# Patient Record
(demographics unavailable — no encounter records)

---

## 2017-10-12 NOTE — ERA
ER Documentation


Chief Complaint


Date/Time


DATE: 10/12/17 


TIME: 23:54


Chief Complaint


PT reports RUQ pain that radiates to back worsens after eating





HPI


41-year-old female patient with no significant past medical history presents to 

the ED complaining of right upper quadrant abdominal pain that radiates to her 

back that started earlier today.  Patient states that it worsens after eating.  

Reports that her last meal was at 12 PM.  Describes the pain as pressure-like 

and rates it a 6 out of 10.  Denies any fever, chills, nausea, vomiting, 

diarrhea, constipation, dysuria, urgency, frequency, chest pain, shortness of 

breath.  States that her last menstruation was on 2017.





ROS


All systems reviewed and are negative except as per history of present illness.





Allergies


Allergies:  


Coded Allergies:  


     No Known Allergy (Unverified , 10/12/17)





PMhx/Soc


Medical and Surgical Hx:  pt denies Medical Hx


History of Surgery:  Yes ()


Anesthesia Reaction:  No


Hx Neurological Disorder:  No


Hx Respiratory Disorders:  No


Hx Cardiac Disorders:  No


Hx Psychiatric Problems:  No


Hx Miscellaneous Medical Probl:  No


Hx Alcohol Use:  No


Hx Substance Use:  No


Hx Tobacco Use:  No


Smoking Status:  Never smoker





Physical Exam


Vitals





Vital Signs








  Date Time  Temp Pulse Resp B/P Pulse Ox O2 Delivery O2 Flow Rate FiO2


 


10/13/17 02:42 97.9 78 16 116/80 98 Room Air  


 


10/13/17 01:11 98.1 76 16 132/82 99 Room Air  


 


10/12/17 19:35 99.2 62 16 134/89 99   








Physical Exam


Const: Non-ill-appearing, well-nourished. In no acute distress.


Head: Atraumatic, normocephalic 


Eyes: Normal Conjunctiva without injection. No purulent discharge. 


ENT: Normal external ear, nose. Moist oropharynx without tonsillar exudates. Non

-erythematous pharynx. Uvula midline. No drooling.  No trismus. 


Neck: No cervical midline tenderness.  Full range of motion. No meningismus. No 

cervical lymphadenopathy. No JVD.


Resp: Clear to auscultation bilaterally. No wheezing, rhonchi, rales, or 

crackles. No accessory muscle use. No retractions.


Cardio: Regular rate and rhythm.  No murmurs, rubs or gallops.


Abd: Soft, right upper quadrant tenderness, non distended. Normal bowel sounds.

  No palpable masses.  No rebound tenderness.  No guarding.  Negative McBurney'

s point. Negative psoas sign. Negative obturator sign.


Skin: No petechiae or rashes


Back: No midline tenderness. No CVA tenderness.


Ext: No cyanosis, or edema.


Neur: Awake and alert. Normal gait. Normal coordination. 


Psych: Normal Mood and Affect


Result Diagram:  


10/12/17 2135                                                                  

              10/12/17 2135





Results 24 hrs





 Laboratory Tests








Test


  10/12/17


21:35 10/12/17


22:00


 


White Blood Count 11.410^3/ul  


 


Red Blood Count 4.6310^6/ul  


 


Hemoglobin 13.8g/dl  


 


Hematocrit 41.4%  


 


Mean Corpuscular Volume 89.4fl  


 


Mean Corpuscular Hemoglobin 29.8pg  


 


Mean Corpuscular Hemoglobin


Concent 33.3g/dl 


  


 


 


Red Cell Distribution Width 14.4%  


 


Platelet Count 47296^3/UL  


 


Mean Platelet Volume 11.8fl  


 


Neutrophils % 57.4%  


 


Lymphocytes % 34.0%  


 


Monocytes % 6.2%  


 


Eosinophils % 1.7%  


 


Basophils % 0.4%  


 


Nucleated Red Blood Cells % 0.0/100WBC  


 


Neutrophils # 6.610^3/ul  


 


Lymphocytes # 3.910^3/ul  


 


Monocytes # 0.710^3/ul  


 


Eosinophils # 0.210^3/ul  


 


Basophils # 0.110^3/ul  


 


Nucleated Red Blood Cells # 0.010^3/ul  


 


Sodium Level 141mmol/L  


 


Potassium Level 3.9mmol/L  


 


Chloride Level 108mmol/L  


 


Carbon Dioxide Level 24mmol/L  


 


Anion Gap 13  


 


Blood Urea Nitrogen 10mg/dl  


 


Creatinine 0.61mg/dl  


 


Glucose Level 84mg/dl  


 


Calcium Level 9.5mg/dl  


 


Total Bilirubin 0.0mg/dl  


 


Direct Bilirubin 0.00mg/dl  


 


Indirect Bilirubin 0.0mg/dl  


 


Aspartate Amino Transf


(AST/SGOT) 34IU/L 


  


 


 


Alanine Aminotransferase


(ALT/SGPT) 48IU/L 


  


 


 


Alkaline Phosphatase 131IU/L  


 


Total Protein 8.4g/dl  


 


Albumin 4.5g/dl  


 


Globulin 3.90g/dl  


 


Albumin/Globulin Ratio 1.15  


 


Lipase 113U/L  


 


Urine Color  COLORLESS 


 


Urine Clarity  CLEAR 


 


Urine pH  6.0 


 


Urine Specific Gravity  1.003 


 


Urine Ketones  NEGATIVEmg/dL 


 


Urine Nitrite  NEGATIVEmg/dL 


 


Urine Bilirubin  NEGATIVEmg/dL 


 


Urine Urobilinogen  NEGATIVEmg/dL 


 


Urine Leukocyte Esterase  TRACELeu/ul 


 


Urine Microscopic RBC  0/HPF 


 


Urine Microscopic WBC  2/HPF 


 


Urine Hemoglobin  NEGATIVEmg/dL 


 


Urine Glucose  NEGATIVEmg/dL 


 


Urine Total Protein  NEGATIVEmg/dl 








 Current Medications








 Medications


  (Trade)  Dose


 Ordered  Sig/Justina


 Route


 PRN Reason  Start Time


 Stop Time Status Last Admin


Dose Admin


 


 Famotidine


  (Pepcid)  20 mg  ONCE  STAT


 PO


   10/12/17 20:51


 10/12/17 20:53 DC 10/12/17 21:30


 


 


 Miscellaneous


 Medication 40 ml  40 ml  ONCE  STAT


 PO


   10/12/17 20:51


 10/12/17 20:53 DC 10/12/17 21:30


 


 


 Piperacillin Sod/


 Tazobactam Sod  100 ml @ 


 200 mls/hr  ONCE  ONCE


 IVPB


   10/12/17 23:00


 10/12/17 23:29 DC 10/12/17 23:29


 


 


 Sodium Chloride


  (NS)  1,000 ml @ 


 1,000 mls/hr  Q1H ONCE


 IV


   10/12/17 23:00


 10/12/17 23:59 DC 10/12/17 23:29


 











Procedures/MDM


41-year-old female patient with no significant past medical history presents to 

the ED complaining of right upper quadrant started earlier today.  Patient is 

afebrile and nontoxic-appearing.  Patient was further worked up with CBC, CMP, 

lipase, UA, gallbladder ultrasound.  Patient's pain and symptoms have improved 

after treatment with GI cocktail, 20 mg famotidine.





CBC:  Leukocytosis of 11.4. No e/o of systemic infection. No e/o anemia.


CMP: No e/o severe acidosis, alkalosis, renal failure, diabetic ketoacidosis, 

liver disease


Lipase within normal limits.


Urine: No leukocyte esterase, no nitrites, no hematuria. 


Urine pregnancy: Negative








PROCEDURE:  US abdomen right upper quadrant


 


CLINICAL INDICATION:  Abdominal pain. 


 


TECHNIQUE:  Gray scale and color Doppler ultrasound of the right upper quadrant 

of the abdomen was performed.


 


COMPARISON:   None available.


 


FINDINGS:  


 


Pancreas:  Visualized portions are unremarkable.


 


Liver: Normal in size and echogenicity with no focal hepatic lesion. 

Hepatopedal flow in the main portal vein.


 


Gallbladder: Distended with stones. Mild gallbladder wall thickening measuring 

4 mm and trace pericholecystic fluid. Gallbladder polyp measuring 5 mm. 

Negative sonographic Bonds's sign.


 


Common bile duct: 4.4 mm in diameter.


 


Right Kidney: 9.8 cm in length. No nephrolithiasis, hydronephrosis, or mass. 

There is linear echogenicity that is isoechoic to the cortex extending from the 

upper pole towards the renal hilum, suggestive of a column of Fabien.


 


Ascites:  None.


 


IMPRESSION:


 


1.  Distended gallbladder with stones demonstrating mild wall thickening and 

trace pericholecystic fluid, but a negative sonographic Bonds's sign. This 

examination is therefore equivocal for the presence of acute cholecystitis. 

Correlate for recent pain medication administration to suggest a false negative 

sonographic Bonds's sign. Nuclear medicine hepatobiliary scan can be performed 

for further evaluation.


2.  Right renal findings as described, suggestive of a column of Fabien at the 

upper pole. No hydronephrosis or suspicious renal mass.


3.  Gallbladder polyp measuring 5 mm.  





Patient has cholecystitis noted on ultrasound.  This was discussed with my 

supervising physician, Dr. Rojas who will speak with the surgeon on call for 

further consultation.  Patient will be admitted.  Patient agreed to admission 

questions were answered.  Patient will now be under the care of Dr. Rojas for 

further consultation of specialists. Patient was given her first dose of Zosyn 

here in the ED as well as 1 L of normal saline.  Low suspicion for ectopic 

pregnancy, ovarian torsion, gastritis, GERD, peptic ulcer disease, 

choledocholithiasis, cholangitis, pancreatitis, appendicitis, bowel obstruction

, ileus, volvulus, nephrolithiasis, pyelonephritis, hepatitis, perforated viscus

, diverticulitis, strangulated/incarcerated hernia, DKA, acute abdomen, 

mesenteric ischemia or other emergent conditions.  Patient is hemodynamically 

stable.





Departure


Diagnosis:  


 Primary Impression:  


 Cholecystitis


Condition:  Stable











MARILYN HAM PA-C Oct 12, 2017 23:59

## 2017-10-13 NOTE — QN
Documentation


Comment





Chief complaint: Abdominal pain





History of present illness: The patient is a 41-year-old female, who was 

initially seen by the PA in the ED 2, presenting to the ER because of 

intermittent abdominal pain that began today, localize at the right upper 

quadrant, denies any aggravating or relieving factor, denies nausea, vomiting, 

dysuria, diarrhea.  She does not smoke nor drink





Past medical/surgical history: None 





 Const:      No acute distress.


 Head:        Atraumatic.


 Eyes:       Normal Conjunctiva.


 ENT:         Normal External Ears, Nose and Mouth.


 Neck:        Full range of motion.  No meningismus.


 Resp:         Clear to auscultation bilaterally.


 Cardio:       Regular rate and rhythm.


 Abd:         Soft,  non distended, normal bowel sounds, Mild epigastric, right 

upper quadrant discomfort, no RLQ, rigidity, rebound, CVA tenderness


 Skin:         No petechiae or rashes.


 Back:        No midline or flank tenderness.


 Ext:          No cyanosis, or edema.


 Neur:        Awake and alert. No focal deficit


 Psych:        Normal Mood and Affect.





Diagnostic study:





   [place] [id number]


   Radiology Main Line: [phone]





   DIAGNOSTIC IMAGING REPORT





   Patient: AGUEDA CHAPMAN   : 1976   Age: 41  Sex: F                     

   


   [facility]:    [id number]   [id number]    DOS: 10/12/17 2051


   Ordering MD: MARILYN HAM PA-C   Location:  [facility]   Room/Bed:                

                            


   








PROCEDURE:  US abdomen right upper quadrant


 


CLINICAL INDICATION:  Abdominal pain. 


 


TECHNIQUE:  Gray scale and color Doppler ultrasound of the right upper quadrant 

of the abdomen was performed.


 


COMPARISON:   None available.


 


FINDINGS:  


 


Pancreas:  Visualized portions are unremarkable.


 


Liver: Normal in size and echogenicity with no focal hepatic lesion. 

Hepatopedal flow in the main portal vein.


 


Gallbladder: Distended with stones. Mild gallbladder wall thickening measuring 

4 mm and trace pericholecystic fluid. Gallbladder polyp measuring 5 mm. 

Negative sonographic Bonds's sign.


 


Common bile duct: 4.4 mm in diameter.


 


Right Kidney: 9.8 cm in length. No nephrolithiasis, hydronephrosis, or mass. 

There is linear echogenicity that is isoechoic to the cortex extending from the 

upper pole towards the renal hilum, suggestive of a column of Fabien.


 


Ascites:  None.


 


IMPRESSION:


 


1.  Distended gallbladder with stones demonstrating mild wall thickening and 

trace pericholecystic fluid, but a negative sonographic Bonds's sign. This 

examination is therefore equivocal for the presence of acute cholecystitis. 

Correlate for recent pain medication administration to suggest a false negative 

sonographic Bonds's sign. Nuclear medicine hepatobiliary scan can be performed 

for further evaluation.


2.  Right renal findings as described, suggestive of a column of Fabien at the 

upper pole. No hydronephrosis or suspicious renal mass.


3.  Gallbladder polyp measuring 5 mm.  


 


RPTAT: HLBP


_____________________________________________ 


.Obie Quiroga MD, MD           Date    Time 


Electronically viewed and signed by .Obie Quiroga MD, MD on 10/12/2017 22:04 


 


D:  10/12/2017 22:04  T:  10/12/2017 22:04


.P/





CC: MARILYN HAM PA-C





MEDICAL MAKING DECISION: The patient is a 41-year-old female, presenting with 

acute symptomatic cholelithiasis, concerning for acute cholecystitis.  She was 

treated with 1 L normal saline, Zosyn IV with good response.





The differential diagnoses considered include but are not limited to 

cholelithiasis, cholecystitis, cystitis, pancreatitis, hepatitis, gastritis, 

peptic ulcer disease, gastric ulcer, appendicitis, diverticulitis, cholangitis, 

choledocholithiasis, partial small bowel obstruction.





Consultation: I discussed the patient with the [facility] general surgeon, Dr Macario who recommended admission.





Diagnostic impression: Acute symptomatic cholelithiasis





Disposition: I discussed the patient with the on-call physician for her IPA, Dr Gil who accepted the patient to [facility].  She will be transferred via 

ambulance





The patient's blood pressure was elevated (>120/80) but appears stable without 

evidence of hypertension emergency or urgency.  The patient was counseled about 

the risks of hypertension and urged to pursue outpatient monitoring and therapy 

within a week with their primary care physician.











MARY ANNE MAZARIEGOS MD Oct 13, 2017 02:04

## 2019-09-13 NOTE — RADRPT
PROCEDURE:  US abdomen right upper quadrant

 

CLINICAL INDICATION:  Abdominal pain. 

 

TECHNIQUE:  Gray scale and color Doppler ultrasound of the right upper quadrant of the abdomen was p
erformed.

 

COMPARISON:   None available.

 

FINDINGS:  

 

Pancreas:  Visualized portions are unremarkable.

 

Liver: Normal in size and echogenicity with no focal hepatic lesion. Hepatopedal flow in the main po
rtal vein.

 

Gallbladder: Distended with stones. Mild gallbladder wall thickening measuring 4 mm and trace perich
olecystic fluid. Gallbladder polyp measuring 5 mm. Negative sonographic Bonds's sign.

 

Common bile duct: 4.4 mm in diameter.

 

Right Kidney: 9.8 cm in length. No nephrolithiasis, hydronephrosis, or mass. There is linear echogen
icity that is isoechoic to the cortex extending from the upper pole towards the renal hilum, suggest
aylin of a column of Fabien.

 

Ascites:  None.

 

IMPRESSION:

 

1.  Distended gallbladder with stones demonstrating mild wall thickening and trace pericholecystic f
luid, but a negative sonographic Bonds's sign. This examination is therefore equivocal for the pres
ence of acute cholecystitis. Correlate for recent pain medication administration to suggest a false 
negative sonographic Bonds's sign. Nuclear medicine hepatobiliary scan can be performed for further
 evaluation.

2.  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 No hydron
ephrosis or suspicious renal mass.

3.  Gallbladder polyp measuring 5 mm.  

 

RPTAT: HLBP

_____________________________________________ 

.Obie Quiroga MD, MD           Date    Time 

Electronically viewed and signed by .Obie Quiroga MD, MD on 10/12/2017 22:04 

 

D:  10/12/2017 22:04  T:  10/12/2017 22:04

.P/ decreased strength/impaired balance